# Patient Record
Sex: FEMALE | Race: WHITE
[De-identification: names, ages, dates, MRNs, and addresses within clinical notes are randomized per-mention and may not be internally consistent; named-entity substitution may affect disease eponyms.]

---

## 2019-04-04 ENCOUNTER — HOSPITAL ENCOUNTER (EMERGENCY)
Dept: HOSPITAL 72 - EMR | Age: 49
Discharge: HOME | End: 2019-04-04
Payer: COMMERCIAL

## 2019-04-04 VITALS — HEIGHT: 66 IN | WEIGHT: 113 LBS | BODY MASS INDEX: 18.16 KG/M2

## 2019-04-04 VITALS — DIASTOLIC BLOOD PRESSURE: 84 MMHG | SYSTOLIC BLOOD PRESSURE: 143 MMHG

## 2019-04-04 DIAGNOSIS — S82.201A: ICD-10-CM

## 2019-04-04 DIAGNOSIS — I10: ICD-10-CM
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
## 2019-04-04 DIAGNOSIS — S82.434A: Primary | ICD-10-CM
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
## 2019-04-04 PROCEDURE — 99283 EMERGENCY DEPT VISIT LOW MDM: CPT

## 2019-04-04 PROCEDURE — 29515 APPLICATION SHORT LEG SPLINT: CPT

## 2019-04-04 PROCEDURE — 96372 THER/PROPH/DIAG INJ SC/IM: CPT

## 2019-04-04 NOTE — NUR
ED Nurse Note:

Patient cleared for discharge by [facility]. Patient helped with wheelchair to car, 
verbalized understanding of discharge instructions as well as crutch use 
instructions. Patient was A&Ox4, no s/s of acute distress. Patient departed 
with all personal belongings.

## 2019-04-04 NOTE — EMERGENCY ROOM REPORT
History of Present Illness


General


Chief Complaint:  Lower Extremity Injury


Source:  Patient





Present Illness


HPI


Patient presents with complaints of pain to the right ankle patient was 

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


Pain localized to the ankle 8 out of 10 denies any knee pain





Denies any pelvic pain





Denies any lapse of consciousness there was increased swelling and pain with 

any touch


Allergies:  


Coded Allergies:  


     No Known Allergies (Unverified , 4/4/19)





Patient History


Past Medical History:  see triage record


Pertinent Family History:  none


Last Menstrual Period:  3/24/19


Pregnant Now:  No


Reviewed Nursing Documentation:  PMH: Agreed; PSxH: Agreed





Nursing Documentation-PMH


Past Medical History:  No History, Except For


Hx Hypertension:  Yes





Review of Systems


All Other Systems:  negative except mentioned in HPI





Physical Exam





Vital Signs








  Date Time  Temp Pulse Resp B/P (MAP) Pulse Ox O2 Delivery O2 Flow Rate FiO2


 


4/4/19 21:21 98.2 87 14 143/84 97 Room Air  








Sp02 EP Interpretation:  reviewed, normal


General Appearance:  well appearing, no apparent distress


Head:  normocephalic, atraumatic


Eyes:  bilateral eye PERRL, bilateral eye EOMI


ENT:  hearing grossly normal, normal pharynx


Neck:  supple


Respiratory:  lungs clear


Cardiovascular #1:  regular rate, rhythm


Gastrointestinal:  non tender, soft


Musculoskeletal:  swelling - Significant swelling and discomfort to palpation 

lateral ankle on the right side, good pulses intact dorsally, nontender 

proximally


Neurologic:  alert, oriented x3, responsive


Skin:  other - As above


Lymphatic:  no adenopathy





Procedures


Splinting


Splinting :  


   Consent:  Emergent


   Location:  Right leg


   Pre-Made Type:  velcro


   Hand-Made Type:  plaster


   Splint:  sugar-tong


   Pre-Proc Neuro Vasc Exam:  normal


   Post-Proc Neuro Vasc Exam:  normal


   Patient Tolerated:  Well


   Complications:  None





Medical Decision Making


Diagnostic Impression:  


 Primary Impression:  


 fibular fracture


 Additional Impression:  


 tibial fracture


ER Course


Imaging reveals acute fracture involving the distal fibular and tibial area


On the lateral view the posterior tibia does appear to be involved





Patient has splint applied she requires close orthopedic follow-up





She remains neurovascularly intact





Nonweightbearing





Referral is provided for orthopedics


Other X-Ray Diagnostic Results


Other X-Ray Diagnostic Results :  


   X-Ray ordered:  Right ankle


   # of Views/Limited Vs Complete:  3 View


   Indication:  Pain


   EP Interpretation:  Yes


   Interpretation:  other - Acute spiral distal fibular fracture, evidence of 

possible tibial involvement, also soft tissue swelling


   Impression:  Other - Acute distal tibial fib fracture


   Electronically Signed by:  Mony Dailey DO





Last Vital Signs








  Date Time  Temp Pulse Resp B/P (MAP) Pulse Ox O2 Delivery O2 Flow Rate FiO2


 


4/4/19 22:52 98.2  14 143/84 97 Room Air  


 


4/4/19 21:21  87      








Status:  improved


Disposition:  HOME, SELF-CARE


Condition:  Improved


Scripts


Hydrocodone Bit/Acetaminophen 5-325* (NORCO 5-325*) 1 Each Tablet


1 TAB ORAL Q6H PRN for For Pain, #8 TAB 0 Refills


   Prov: Mony Dailey DO         4/4/19 


Ibuprofen* (MOTRIN*) 600 Mg Tablet


600 MG ORAL Q8H PRN for For Pain, #20 TAB 0 Refills


   Prov: Mony Dailey DO         4/4/19


Referrals:  


Keith Avila MD


Patient Instructions:  Tibial and Fibular Fracture, Adult, Ankle Fracture, Easy-

to-Read





Additional Instructions:  


Patient is provided with the discharge instructions notified to follow up with 

primary doctor in the next 2-3 days otherwise return to the er with any 

worsening symptoms.


Please note that this report is being documented using DRAGON technology.  This 

can lead to erroneous entry secondary to incorrect interpretation by the 

dictating instrument.











Mony Dailey DO Apr 4, 2019 23:24

## 2019-04-04 NOTE — NUR
ED Nurse Note:

Patient tolerated pain medication well and reports that pain has decreased by 
half the intensity at this time. Patient's  is at bedside.

## 2019-04-05 NOTE — DIAGNOSTIC IMAGING REPORT
Indication: Pain, soccer injury, trauma

 

Technique: 3 views of the right ankle

 

Comparison: none 

 

Findings: There is an oblique fracture of the distal fibula, probably comminuted,

minimally displaced. There is overlying soft tissue swelling. No associated tibial or

talar fracture demonstrated.

 

Impression: Positive for distal fibular fracture

## 2019-04-05 NOTE — DIAGNOSTIC IMAGING REPORT
Indication: Pain, soccer injury

 

Technique: 2 views of the left tibia and fibula

 

Comparison: none

 

Findings: There is a nondisplaced oblique fracture of the distal fibula. No tibial

fracture demonstrated. No radiopaque foreign body

 

Impression: Positive for distal fibular fracture